# Patient Record
Sex: FEMALE | Race: ASIAN | NOT HISPANIC OR LATINO | URBAN - METROPOLITAN AREA
[De-identification: names, ages, dates, MRNs, and addresses within clinical notes are randomized per-mention and may not be internally consistent; named-entity substitution may affect disease eponyms.]

---

## 2018-07-20 ENCOUNTER — EMERGENCY (EMERGENCY)
Facility: HOSPITAL | Age: 73
LOS: 1 days | Discharge: HOME | End: 2018-07-20
Attending: EMERGENCY MEDICINE
Payer: COMMERCIAL

## 2018-07-20 VITALS
TEMPERATURE: 98 F | HEART RATE: 82 BPM | RESPIRATION RATE: 18 BRPM | SYSTOLIC BLOOD PRESSURE: 140 MMHG | DIASTOLIC BLOOD PRESSURE: 65 MMHG | OXYGEN SATURATION: 96 %

## 2018-07-20 DIAGNOSIS — Z98.891 HISTORY OF UTERINE SCAR FROM PREVIOUS SURGERY: Chronic | ICD-10-CM

## 2018-07-20 PROCEDURE — 99283 EMERGENCY DEPT VISIT LOW MDM: CPT | Mod: GC

## 2018-07-20 PROCEDURE — 72100 X-RAY EXAM L-S SPINE 2/3 VWS: CPT

## 2018-07-20 PROCEDURE — 73502 X-RAY EXAM HIP UNI 2-3 VIEWS: CPT

## 2018-07-20 PROCEDURE — 72170 X-RAY EXAM OF PELVIS: CPT

## 2018-07-20 PROCEDURE — 73502 X-RAY EXAM HIP UNI 2-3 VIEWS: CPT | Mod: 26,RT

## 2018-07-20 PROCEDURE — 99284 EMERGENCY DEPT VISIT MOD MDM: CPT

## 2018-07-20 PROCEDURE — 72100 X-RAY EXAM L-S SPINE 2/3 VWS: CPT | Mod: 26

## 2018-07-20 RX ORDER — IBUPROFEN 200 MG
400 TABLET ORAL ONCE
Qty: 0 | Refills: 0 | Status: COMPLETED | OUTPATIENT
Start: 2018-07-20 | End: 2018-07-20

## 2018-07-20 RX ORDER — LIDOCAINE 4 G/100G
1 CREAM TOPICAL ONCE
Qty: 0 | Refills: 0 | Status: COMPLETED | OUTPATIENT
Start: 2018-07-20 | End: 2018-07-20

## 2018-07-20 RX ORDER — CYCLOBENZAPRINE HYDROCHLORIDE 10 MG/1
5 TABLET, FILM COATED ORAL ONCE
Qty: 0 | Refills: 0 | Status: DISCONTINUED | OUTPATIENT
Start: 2018-07-20 | End: 2018-07-20

## 2018-07-20 RX ORDER — ACETAMINOPHEN 500 MG
975 TABLET ORAL ONCE
Qty: 0 | Refills: 0 | Status: COMPLETED | OUTPATIENT
Start: 2018-07-20 | End: 2018-07-20

## 2018-07-20 RX ADMIN — Medication 975 MILLIGRAM(S): at 17:19

## 2018-07-20 RX ADMIN — Medication 400 MILLIGRAM(S): at 17:19

## 2018-07-20 RX ADMIN — LIDOCAINE 1 PATCH: 4 CREAM TOPICAL at 17:19

## 2018-07-20 NOTE — ED PROVIDER NOTE - PHYSICAL EXAMINATION
*GEN:   uncomfortable, AOx3    ///    *EYES:   pupils equally round and reactive to light, extra-occular movements intact    ///    *HEENT:   airway patent, moist mucosal membranes    ///    *CV:   regular rate and rhythm    ///    *RESP:   clear to auscultation bilaterally, non-labored    ///    *ABD:   soft, non-tender    ///    *:   no cva/flank tenderness    ///    *MSK:   no MSK tenderness in midline spine; no limited ROM throughout    ///    *SKIN:   2 patches of homogenous 1st degree burn / erythema over R low back consistent w/ burn after hot water application     ///    *NEURO:   AOx3, cranial nerves intact throughout, strength 5/5, no focal loss of sensation, no pronator drift, finger/nose normal, ambulating slowly and w/ obvious discomfort

## 2018-07-20 NOTE — ED PROVIDER NOTE - CARE PLAN
Principal Discharge DX:	Acute right-sided low back pain without sciatica  Secondary Diagnosis:	Accidental fall on or from stairs or steps, initial encounter

## 2018-07-20 NOTE — ED ADULT NURSE NOTE - OBJECTIVE STATEMENT
73 year old female patient presents to ED c/o lower R back pain s/p trip and fall in garden. Patient states she was gardening outside when she tripped and fell on to R side. Patient took tylenol earlier and used hot packs with little relief of pain. Erythema noted to R flank with small blister noted - patient states it is from using hot pack. Patient unable to stand or bear weight without difficulty - patient used walker to ambulate to ER. Patient denies other injuries, numbness or tingling.
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
## 2018-07-20 NOTE — ED PROVIDER NOTE - MEDICAL DECISION MAKING DETAILS
73F w/ R back pain s/p fall uncomfortable physical exam w/out tenderness - clinically inconsistent w/ compression fracture, no bony pelvic tenderness - likely MSK bruise / injury exacerbated by hot water application 1st degree burn - attempt sx relief, reassess, likely dc if improved

## 2018-07-20 NOTE — ED PROVIDER NOTE - PROGRESS NOTE DETAILS
Carlos Ghotra PGY2: pain improved walking improved; her cousin will drive her home - will give flexeril, send w/ some Attending MD Farris: Patient re-evaluated and feeling improved.  Ambulating at baseline with walker.  No acute issues at  this time.  Radiology tests reviewed with patient and cousin.  Luciano covered with bacitracin and Tefla.  Recommended same regimen at home.  Extensive return to ED instructions given.  Patient stable for discharge. Follow up instructions given, importance of follow up emphasized, return to ED parameters reviewed and patient verbalized understanding.  All questions answered, all concerns addressed.

## 2018-07-20 NOTE — ED PROVIDER NOTE - OBJECTIVE STATEMENT
73F w/ pmh HTN, HLD, DM, hypothyroidism p/w R back pain s/p fall. Pt was walking down stairs in backyard garden when tripped and fell onto R back / buttock, unable to stand immediately - eventually able to stand and ambulate w/ walker (usu walks unassisted) but pain persisted despite 1g tylenol @1pm so came to ED. no h/o back surgery; No recent travel, medication change, illness, or hospitalization.     Lives at home    PCP: Parrish Granados / Eva 73F w/ pmh HTN, HLD, DM, hypothyroidism p/w R back pain s/p fall. Pt was walking down stairs in backyard garden when tripped and fell onto R back / buttock, unable to stand immediately - eventually able to stand and ambulate w/ walker (usu walks unassisted) but pain persisted despite 1g tylenol @1pm so came to ED. no h/o back surgery; No recent travel, medication change, illness, or hospitalization. No difficulty passing stool/urine since then. No focal loss of sensation.    Lives at home w/ cousin and adult son.    PCP: Parrish Granados / Eva
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
## 2022-04-25 NOTE — ED PROVIDER NOTE - NS ED MD DISPO DISCHARGE
[FreeTextEntry1] : 65 year-old patient for gynecological exam..\par Patient complains of 3 UTIs in the last year.\par Patient currently has no symptoms of UTI . [Mammogramdate] : 2021 [PapSmeardate] : 2021 [BoneDensityDate] : 2019 [ColonoscopyDate] : 2019 [Currently In Menopause] : currently in menopause [Menopause Age: ____] : age at menopause was [unfilled] Home